# Patient Record
Sex: FEMALE | Race: WHITE | NOT HISPANIC OR LATINO | Employment: OTHER | URBAN - METROPOLITAN AREA
[De-identification: names, ages, dates, MRNs, and addresses within clinical notes are randomized per-mention and may not be internally consistent; named-entity substitution may affect disease eponyms.]

---

## 2021-01-01 ENCOUNTER — HOSPITAL ENCOUNTER (EMERGENCY)
Facility: CLINIC | Age: 86
Discharge: HOME | End: 2021-10-17
Attending: EMERGENCY MEDICINE | Admitting: EMERGENCY MEDICINE
Payer: COMMERCIAL

## 2021-01-01 ENCOUNTER — APPOINTMENT (OUTPATIENT)
Dept: CT IMAGING | Facility: CLINIC | Age: 86
End: 2021-01-01
Attending: EMERGENCY MEDICINE
Payer: COMMERCIAL

## 2021-01-01 VITALS
HEIGHT: 63 IN | SYSTOLIC BLOOD PRESSURE: 149 MMHG | RESPIRATION RATE: 14 BRPM | TEMPERATURE: 97.9 F | HEART RATE: 87 BPM | DIASTOLIC BLOOD PRESSURE: 72 MMHG | WEIGHT: 125 LBS | BODY MASS INDEX: 22.15 KG/M2 | OXYGEN SATURATION: 98 %

## 2021-01-01 DIAGNOSIS — S81.812A LACERATION OF LEFT LOWER EXTREMITY, INITIAL ENCOUNTER: ICD-10-CM

## 2021-01-01 DIAGNOSIS — S20.219A CONTUSION OF CHEST WALL, UNSPECIFIED LATERALITY, INITIAL ENCOUNTER: ICD-10-CM

## 2021-01-01 PROCEDURE — 12002 RPR S/N/AX/GEN/TRNK2.6-7.5CM: CPT

## 2021-01-01 PROCEDURE — 250N000013 HC RX MED GY IP 250 OP 250 PS 637: Performed by: EMERGENCY MEDICINE

## 2021-01-01 PROCEDURE — 99284 EMERGENCY DEPT VISIT MOD MDM: CPT | Mod: 25

## 2021-01-01 PROCEDURE — 71250 CT THORAX DX C-: CPT

## 2021-01-01 RX ORDER — HYDROCODONE BITARTRATE AND ACETAMINOPHEN 5; 325 MG/1; MG/1
1 TABLET ORAL EVERY 6 HOURS PRN
Qty: 10 TABLET | Refills: 0 | Status: SHIPPED | OUTPATIENT
Start: 2021-01-01 | End: 2021-01-01

## 2021-01-01 RX ORDER — ACETAMINOPHEN 325 MG/1
650 TABLET ORAL ONCE
Status: COMPLETED | OUTPATIENT
Start: 2021-01-01 | End: 2021-01-01

## 2021-01-01 RX ADMIN — ACETAMINOPHEN 650 MG: 325 TABLET, FILM COATED ORAL at 18:33

## 2021-01-01 ASSESSMENT — ENCOUNTER SYMPTOMS
BACK PAIN: 1
WOUND: 1
HEADACHES: 0

## 2021-01-01 ASSESSMENT — MIFFLIN-ST. JEOR: SCORE: 946.13

## 2021-10-17 NOTE — ED PROVIDER NOTES
"  History   Chief Complaint:  Fall     HPI   Sandra Perez is a 89 year old female anticoagulated on Coumadin with history of atrial fibrillation and diabetes mellitus who presents after a fall. The patient states she was walking into her bedroom when she slipped and fell. She is now experiencing chest pain and pleuritic pain. She also has some pain in her back and posterior left lower leg. She sustained a laceration to her left lower leg during the fall and an abrasion to her cheek. She denies headache. Unsure if she hit her head.    Review of Systems   Cardiovascular: Positive for chest pain.   Musculoskeletal: Positive for back pain.   Skin: Positive for wound.   Neurological: Negative for headaches.   All other systems reviewed and are negative.    Allergies:  Atenolol    Medications:  Lanoxin  Cardizem  Levothyroxine  Prilosec  Ultram  Desyrel  Coumadin  Protonix  Metofrmin    Past Medical History:     Arrhythmia  Arthritis  Atrial fibrillation  Diabetes mellitus   Anemia  Hypothyroidism  Gastroesophageal reflux disease     Past Surgical History:    Appendectomy   Pacemaker placement  Cholecystectomy   Gastric bypass  Cornea surgery x2  Mastoid surgery    Social History:  Presents with her daughter  Lives with her daughter    Physical Exam     Patient Vitals for the past 24 hrs:   BP Temp Temp src Pulse Resp SpO2 Height Weight   10/17/21 2000 (!) 149/72 -- -- -- 14 98 % -- --   10/17/21 1830 (!) 154/72 -- -- 87 -- 98 % -- --   10/17/21 1632 (!) 157/81 97.9  F (36.6  C) Temporal 89 18 99 % 1.6 m (5' 3\") 56.7 kg (125 lb)       Physical Exam  Constitutional: Elderly white female sitting in no respiratory distress.   HENT: No bruising, swelling or tenderness. 3 cm abrasion over left jaw, able to open and close mouth fully.  Eyes: EOM are normal. Pupils are equal, round, and reactive to light.   Neck: Normal range of motion. No JVD present. No cervical adenopathy.No new posterior midline " tenderness.  Cardiovascular: Regular rhythm.  Exam reveals no gallop and no friction rub.    1/6 systolic murmur, LUSB.  Pulmonary/Chest: Bilateral breath sounds normal. No wheezes, rhonchi or rales. Both right and left anterior rib area tenderness. No sternal tenderness. No crepitus or flail. Tenderness to mid-thoracic spine.  Abdominal: Soft. No tenderness. No rebound or guarding.   Musculoskeletal: No edema. No tenderness. Full ROM of both arms and legs. Multiple areas of bruising and abrasion.   Lymphadenopathy: No lymphadenopathy.   Neurological: Alert and oriented to person, place, and time. Normal strength. Coordination normal. GCS 15.  Skin: Skin is warm and dry. No rash noted. No erythema. Left lower leg 7 cm flap laceration mid-lateral leg.     Emergency Department Course     Imaging:  Chest CT w/o contrast   Final Result   IMPRESSION:    1.  No evidence for acute traumatic injury within the chest.   2.  Scattered groundglass opacities left lower lobe, nonspecific, however pneumonia possible. Clinical correlation recommended.   3.  Gastric bypass.           Report per radiology    Laboratory:  Labs Ordered and Resulted from Time of ED Arrival Up to the Time of Departure from the ED - No data to display     Procedures  Left leg laceration repair:   This is a 7 cm flap laceration apex proximal.   0.25% Sensorcaine with epinephrine for anesthesia.  Laceration was copiously irrigated and approximated 4.0 Ethylon simple.  No fb  Compression dressing was applied.    Emergency Department Course:  Reviewed:  I reviewed nursing notes, vitals and past medical history    Assessments:  1800 I obtained history and examined the patient as noted above.    I rechecked the patient and explained findings.     Interventions:  1833: Tylenol 650 mg PO     Disposition:  The patient was discharged to home.     Impression & Plan     Medical Decision Making:  The patient is a 89 year old woman who lost her balance and fell at  home. She did scrape her jaw on the left side but denies head injury or any new neck pain. She has anterior chest discomfort and she has a large flap laceration on her left leg. She has bruising on her right lower leg as well as other upper extremities. Given the multiple areas of soreness in her upper back as well as anterior chest, I obtained a CT scan without contrast which showed no acute fracture. She received some Tylenol and cold compresses. Tetanus is up to date. She is here with her daughter. We will put on ace wrap on the right lower leg and a compression dressing on the left. I will give her a small prescription of Vicodin as needed with warnings to take half or one tablet only. She was instructed to use cold compresses and Tylenol. Stitches should be out in 14 days.     Diagnosis:    ICD-10-CM    1. Contusion of chest wall, unspecified laterality, initial encounter  S20.219A    2. Laceration of left lower extremity, initial encounter  S81.812A        Discharge Medications:  Discharge Medication List as of 10/17/2021  7:55 PM      START taking these medications    Details   HYDROcodone-acetaminophen (NORCO) 5-325 MG tablet Take 1 tablet by mouth every 6 hours as needed for severe pain, Disp-10 tablet, R-0, Local Print             Scribe Disclosure:  I, Lisette Bo, am serving as a scribe at 5:41 PM on 10/17/2021 to document services personally performed by Cal Painter MD based on my observations and the provider's statements to me.             Cal Painter MD  10/17/21 [id number]

## 2021-10-17 NOTE — ED TRIAGE NOTES
Pt was walking into bedroom and fell. Pt c/o pain in chest, back rt leg, and has lac to lt lower leg, abrasion to lt cheek. No LOC. Pt reports hit head. Denies head painPt on warfarin